# Patient Record
(demographics unavailable — no encounter records)

---

## 2025-01-13 NOTE — CONSULT LETTER
[Dear  ___] : Dear  [unfilled], [Consult Letter:] : I had the pleasure of evaluating your patient, [unfilled]. [Please see my note below.] : Please see my note below. [Consult Closing:] : Thank you very much for allowing me to participate in the care of this patient.  If you have any questions, please do not hesitate to contact me. [Sincerely,] : Sincerely, [FreeTextEntry3] : Roscoe Carnes MD\par

## 2025-01-13 NOTE — ASSESSMENT
[FreeTextEntry1] : Mrs. Jamil is a 76-year-old who suffers from atypical facial pain possibly due to paranasal sinus disease.  Her major complaint is referable to cognition particularly name recall.  It is unclear whether this is due to normal aging or possibly an early neurodegenerative disorder.  I had a long discussion with her regarding diagnostic options including serum phosphorylated tau testing, amyloid or FDG PET scans.  She does not wish to clarify her diagnosis.  She does not wish to pursue any treatment including cholinesterase inhibitors.  She agreed to a follow-up visit in 9 months.  Telephone contact will be maintained in the meantime.

## 2025-01-13 NOTE — PHYSICAL EXAM
[FreeTextEntry1] : Constitutional:  Patient was well-developed, well-nourished and in no acute distress.   Head:  Normocephalic, atraumatic. Tympanic membranes were not examined.   Neck:  Supple with full range of motion.   Cardiovascular:  Cardiac rhythm was regular without murmur. There were no carotid bruits. Peripheral pulses were full and symmetric.  Respiratory:  Lungs were clear.   Abdomen:  Soft and nontender.   Spine:  Nontender.  There was no pain on straight leg raising.  Jose's maneuver was negative.  Skin:  There were no rashes.   NEUROLOGICAL EXAMINATION:  Mental Status: Patient was alert and oriented. Speech was fluent. There was no dysarthria.  She scored 29 out of 30 on MMSE making a simple error copying interlocking pentagons.  Cranial Nerves:   II: She could finger count bilaterally. Pupils were equal and reactive. Visual fields were full. Funduscopic examination was normal.   III, IV, VI:  Eye movements were full without nystagmus.   V: Facial sensation was intact.   VII: Facial strength was normal.   VIII: Hearing was equal.   IX, X: Palatal movement was normal. Phonation was normal.   XI: Sternocleidomastoids and trapezii were normal.   XII: Tongue was midline and movements normal. There was no lingual atrophy or fasciculations.   Motor Examination: Muscle bulk, tone and strength were normal.   Sensory Examination: Pinprick and vibration sense were intact in the toes.  Reflexes: DTRs were 1 at the biceps, 2 at the knees and ankles and absent elsewhere.  Plantar Responses: Plantar responses were flexor.   Coordination/Cerebellar Function: There was no dysmetria on finger to nose or heel to shin testing.   Gait/Stance: Gait and tandem were normal. Romberg was negative.

## 2025-01-13 NOTE — HISTORY OF PRESENT ILLNESS
[FreeTextEntry1] : Mrs. Marcel Jamil returned to the office having been last seen on March 13, 2024. She is a 76-year-old right-handed patient with chronic recurrent facial discomfort.  She reported rare headaches.  She could not remember the last time that she had discomfort.  She was using a nighttime humidifier.  She reported only mild memory difficulties.  She was practicing as an .  She was overall doing very well.  A carotid Doppler performed in October 2020 revealed bilateral plaque without significant stenosis.  At her March 2024 visit, she described difficulty recalling names and words.  There was a slight delay in retrieval.  This had not impacted her work as an .  There had been no change in personality.  Her children had not commented on her memory.  She often joked about this with her friends who were of similar ages.  She scored 29 out of 30 on MMSE.  She made 1 error in recall.  Serologic workup was notable for a low vitamin D3 level and elevated LDL.  At today's visit, she complains of continued difficulties with name and word recall.  She has noticed a mild action tremor in her right hand.  She had difficulty recalling how to drive to my office this morning.  She continues to work as an  and has had no work related difficulties.  She denies sleep issues or bladder dysfunction.  Past surgical history is notable for right lumpectomy for cancer 2001 with subsequent treatment with radiation and Femara.  She is status post tonsillectomy.  She suffers from hypertension, GERD and hyperlipidemia.  She has been resistant to treatment with a statin.  She has no allergies.  She is a non-smoker and social drinker.  Family history is notable for dystonia, breast and prostate cancer.  Medications include famotidine and losartan.

## 2025-02-13 NOTE — ASSESSMENT
[FreeTextEntry1] : 76-year-old lady.  2001: Right breast conserving surgery/therapy for stage I breast cancer.  Today she is asymptomatic with normal breast imaging.  10/8/2024: Bilateral mammogram and sonogram at [place]: BI-RADS 2. Prescriptions entered today for December 2024.  February 2023: Bilateral breast MRI at [place]: BI-RADS 2. Breast cancer risk score = 24.6.  She prefers to go biannually. Prescription entered today for [place].  Clinically doing well.  If asymptomatic, with normal breast imaging, I have offered to continue to see her annually, sooner if needed.  Reviewed in detail, all questions answered.

## 2025-02-13 NOTE — HISTORY OF PRESENT ILLNESS
[de-identified] : 77-year-old lady.  Annual breast cancer follow-up.  CC: Today she is asymptomatic   2001: RIGHT BREAST conserving surgery for SLN-negative, stage I cancer.  + Radiation therapy from Dr. Oscar JAMESON. + Antiestrogen treatment: Dr. Christo BERNARDO.   No other personal history of malignancy.   +FH: Her mother had postmenopausal breast cancer.  Maternal aunt also had breast cancer.  + Sister: Bilateral breast cancer at 60, her genetic testing is negative.  No relatives with ovarian cancer.  + Ashkenazi  Her father had melanoma and prostate cancer. No other relatives with a history of malignancy.   Genetic testing (for our patient): submitted May 17, 2018, (by us): Negative.   Menarche at 10.  2, para 2, first in . Tamoxifen induced menopause in her early 50s.   Breast cancer risk score = 24.6  The patient's daughter sees Dr. Odom.   She has an appointment with a new internist: Dr. Blaise CARPENTER. He is also a cardiologist. Her previous internist (Dr. Yadi Montero) retired at the end of .  NKDA.  No pacemaker or defibrillator. No anticoagulants  Spring 2020, she was diagnosed with hypertension. Treated with losartan. She has not had to see a cardiologist.  She takes Gaviscon and famotidine for her heartburn.   she had headaches, sinus problems, and facial pain. She was initially seen by ENT (Dr. Christo CARREON) who could not identify a distinct problem. She was referred to neurology (Dr. Roscoe DIAZ) diagnosed with neuralgia. Briefly treated with amitriptyline, this made her very drowsy, so she stopped taking it. Her symptoms have resolved.   She last saw her gynecologist, Dr. Bebe ROCKWELL. 2023 visit was unremarkable. 2025 visit is scheduled.   Her new gastroenterologist is Dr. Mak OCHOA. 2024 Cologuard through his office was negative. Her previous gastroenterologist (Dr. Lisa Toussaint) retired in . 2020 colonoscopy was normal.

## 2025-02-13 NOTE — ASSESSMENT
[FreeTextEntry1] : 76-year-old lady.  2001: Right breast conserving surgery/therapy for stage I breast cancer.  Today she is asymptomatic with normal breast imaging.  10/8/2024: Bilateral mammogram and sonogram at [place]: BI-RADS 2. Prescriptions entered today for December 2024.  February 2023: Bilateral breast MRI at [place]: BI-RADS 2. Breast cancer risk score = 24.6.  She prefers to go biannually. Prescription entered today for [place].  Clinically doing well.  If asymptomatic, with normal breast imaging, I have offered to continue to see her annually, sooner if needed.  Reviewed in detail, all questions answered.

## 2025-02-13 NOTE — PHYSICAL EXAM
[Normal] : supple, no neck mass and thyroid not enlarged [Normal Neck Lymph Nodes] : normal neck lymph nodes  [Normal Supraclavicular Lymph Nodes] : normal supraclavicular lymph nodes [Normal Axillary Lymph Nodes] : normal axillary lymph nodes [Normal] : normal appearance, no rash, nodules, vesicles, ulcers, erythema [de-identified] : Groins not examined [de-identified] : below

## 2025-02-13 NOTE — HISTORY OF PRESENT ILLNESS
[de-identified] : 77-year-old lady.  Annual breast cancer follow-up.  CC: Today she is asymptomatic   2001: RIGHT BREAST conserving surgery for SLN-negative, stage I cancer.  + Radiation therapy from Dr. Oscar JAMESON. + Antiestrogen treatment: Dr. Christo BERNARDO.   No other personal history of malignancy.   +FH: Her mother had postmenopausal breast cancer.  Maternal aunt also had breast cancer.  + Sister: Bilateral breast cancer at 60, her genetic testing is negative.  No relatives with ovarian cancer.  + Ashkenazi  Her father had melanoma and prostate cancer. No other relatives with a history of malignancy.   Genetic testing (for our patient): submitted May 17, 2018, (by us): Negative.   Menarche at 10.  2, para 2, first in . Tamoxifen induced menopause in her early 50s.   Breast cancer risk score = 24.6  The patient's daughter sees Dr. Odom.   She has an appointment with a new internist: Dr. Blaise CARPENTER. He is also a cardiologist. Her previous internist (Dr. Yadi Montero) retired at the end of .  NKDA.  No pacemaker or defibrillator. No anticoagulants  Spring 2020, she was diagnosed with hypertension. Treated with losartan. She has not had to see a cardiologist.  She takes Gaviscon and famotidine for her heartburn.   she had headaches, sinus problems, and facial pain. She was initially seen by ENT (Dr. Christo CARREON) who could not identify a distinct problem. She was referred to neurology (Dr. Roscoe DIAZ) diagnosed with neuralgia. Briefly treated with amitriptyline, this made her very drowsy, so she stopped taking it. Her symptoms have resolved.   She last saw her gynecologist, Dr. Bebe ROCKWELL. 2023 visit was unremarkable. 2025 visit is scheduled.   Her new gastroenterologist is Dr. Mak OCHOA. 2024 Cologuard through his office was negative. Her previous gastroenterologist (Dr. Lisa Toussaint) retired in . 2020 colonoscopy was normal.

## 2025-02-13 NOTE — PHYSICAL EXAM
[Normal] : supple, no neck mass and thyroid not enlarged [Normal Neck Lymph Nodes] : normal neck lymph nodes  [Normal Supraclavicular Lymph Nodes] : normal supraclavicular lymph nodes [Normal Axillary Lymph Nodes] : normal axillary lymph nodes [Normal] : normal appearance, no rash, nodules, vesicles, ulcers, erythema [de-identified] : Groins not examined [de-identified] : below

## 2025-02-13 NOTE — REVIEW OF SYSTEMS
[Negative] : Endocrine [FreeTextEntry4] : Sinusitis [FreeTextEntry5] : Hypertension [FreeTextEntry7] : NKDA [FreeTextEntry8] : Heartburn [de-identified] : Headache/neuralgia [FreeTextEntry1] : Breast cancer

## 2025-02-13 NOTE — REASON FOR VISIT
[Follow-Up Visit] : a follow-up visit for [Other: _____] : [unfilled] [FreeTextEntry2] : October 2021, right breast conserving therapy for stage I cancer.

## 2025-02-13 NOTE — REVIEW OF SYSTEMS
[Negative] : Endocrine [FreeTextEntry4] : Sinusitis [FreeTextEntry5] : Hypertension [FreeTextEntry7] : NKDA [FreeTextEntry8] : Heartburn [de-identified] : Headache/neuralgia [FreeTextEntry1] : Breast cancer

## 2025-06-26 NOTE — PHYSICAL EXAM
[de-identified] : L SF: Swelling Tender A1 FROM No locking  Todays Xrays PA/Lateral/Oblique of the L SF shows no fx

## 2025-06-26 NOTE — HISTORY OF PRESENT ILLNESS
[5] : 5 [1] : 2 [Dull/Aching] : dull/aching [de-identified] : L SF pain for 3 weeks after doing yoga Her hand slipped and her pinky bent sideways  Was seen at [facility], had xrays   [FreeTextEntry1] : L SF

## 2025-06-26 NOTE — ASSESSMENT
[FreeTextEntry1] : This is an acute uncomplicated injury that needs MDP  Medrol Dose Jackson prescribed for pain, swelling and inflammation. Patient instructed to take as directed on packaging to be completed in 6 days. Return prn

## 2025-06-26 NOTE — REASON FOR VISIT
[FreeTextEntry2] : 06/26/2025: 77-year-old female here today for: L SF pain. She states she was doing a yoga pose 3 weeks ago, placing her hands down on the ground but the L hand slipped, and your pinky bent sideways. Went to [facility] and got x-rays.